# Patient Record
Sex: MALE | Race: WHITE
[De-identification: names, ages, dates, MRNs, and addresses within clinical notes are randomized per-mention and may not be internally consistent; named-entity substitution may affect disease eponyms.]

---

## 2018-03-06 ENCOUNTER — HOSPITAL ENCOUNTER (OUTPATIENT)
Dept: HOSPITAL 45 - C.ULTR | Age: 43
Discharge: HOME | End: 2018-03-06
Attending: FAMILY MEDICINE
Payer: COMMERCIAL

## 2018-03-06 DIAGNOSIS — R79.89: Primary | ICD-10-CM

## 2018-03-06 NOTE — DIAGNOSTIC IMAGING REPORT
Biliary ultrasound



CLINICAL HISTORY: OTHER SPECIFIED ABNORMAL FINDINGS OF BLOOD CHEMISTRY    



COMPARISON STUDY:  No previous studies for comparison.



FINDINGS: The pancreas appears sonographically normal. The gallbladder appears

sonographically normal. There is no right-sided hydronephrosis. There is no

ductal dilatation. No hepatic masses are visualized. There is borderline

increase in hepatic echogenicity.



IMPRESSION:  

1. Borderline increase in hepatic echogenicity. This could indicate hepatic

steatosis

2. Otherwise normal biliary ultrasound. No ductal dilatation. 









Electronically signed by:  Toño Jordan M.D.

3/6/2018 1:01 PM



Dictated Date/Time:  3/6/2018 1:00 PM